# Patient Record
Sex: MALE | Race: WHITE
[De-identification: names, ages, dates, MRNs, and addresses within clinical notes are randomized per-mention and may not be internally consistent; named-entity substitution may affect disease eponyms.]

---

## 2018-08-04 ENCOUNTER — HOSPITAL ENCOUNTER (INPATIENT)
Dept: HOSPITAL 12 - ER | Age: 53
LOS: 6 days | Discharge: NURSING, REHAB OR LONG-TERM CARE | DRG: 59 | End: 2018-08-10
Payer: MEDICARE

## 2018-08-04 VITALS — SYSTOLIC BLOOD PRESSURE: 106 MMHG | DIASTOLIC BLOOD PRESSURE: 70 MMHG

## 2018-08-04 VITALS — WEIGHT: 145 LBS | BODY MASS INDEX: 22.76 KG/M2 | HEIGHT: 67 IN

## 2018-08-04 VITALS — SYSTOLIC BLOOD PRESSURE: 110 MMHG | DIASTOLIC BLOOD PRESSURE: 74 MMHG

## 2018-08-04 VITALS — DIASTOLIC BLOOD PRESSURE: 72 MMHG | SYSTOLIC BLOOD PRESSURE: 116 MMHG

## 2018-08-04 DIAGNOSIS — K52.1: ICD-10-CM

## 2018-08-04 DIAGNOSIS — K59.00: ICD-10-CM

## 2018-08-04 DIAGNOSIS — Z74.09: ICD-10-CM

## 2018-08-04 DIAGNOSIS — E05.90: ICD-10-CM
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
## 2018-08-04 DIAGNOSIS — Q63.8: ICD-10-CM

## 2018-08-04 DIAGNOSIS — G35: Primary | ICD-10-CM

## 2018-08-04 DIAGNOSIS — R53.1: ICD-10-CM

## 2018-08-04 DIAGNOSIS — T47.4X5A: ICD-10-CM

## 2018-08-04 DIAGNOSIS — N13.2: ICD-10-CM

## 2018-08-04 DIAGNOSIS — Z87.891: ICD-10-CM

## 2018-08-04 DIAGNOSIS — D72.829: ICD-10-CM

## 2018-08-04 DIAGNOSIS — T38.0X5A: ICD-10-CM

## 2018-08-04 DIAGNOSIS — Z98.890: ICD-10-CM

## 2018-08-04 DIAGNOSIS — N39.0: ICD-10-CM

## 2018-08-04 DIAGNOSIS — D68.59: ICD-10-CM

## 2018-08-04 DIAGNOSIS — M62.838: ICD-10-CM
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
## 2018-08-04 LAB
ALP SERPL-CCNC: 90 U/L (ref 50–136)
ALT SERPL W/O P-5'-P-CCNC: 22 U/L (ref 16–63)
APPEARANCE UR: CLEAR
AST SERPL-CCNC: 23 U/L (ref 15–37)
BASOPHILS # BLD AUTO: 0.1 K/UL (ref 0–8)
BASOPHILS NFR BLD AUTO: 0.9 % (ref 0–2)
BILIRUB SERPL-MCNC: 1.1 MG/DL (ref 0.2–1)
BILIRUB UR QL STRIP: NEGATIVE
BUN SERPL-MCNC: 8 MG/DL (ref 7–18)
CHLORIDE SERPL-SCNC: 105 MMOL/L (ref 98–107)
CO2 SERPL-SCNC: 34 MMOL/L (ref 21–32)
COLOR UR: YELLOW
CREAT SERPL-MCNC: 0.8 MG/DL (ref 0.6–1.3)
DEPRECATED SQUAMOUS URNS QL MICRO: (no result) /HPF
EOSINOPHIL # BLD AUTO: 0.1 K/UL (ref 0–0.7)
EOSINOPHIL NFR BLD AUTO: 1.4 % (ref 0–7)
GLUCOSE SERPL-MCNC: 89 MG/DL (ref 74–106)
GLUCOSE UR STRIP-MCNC: NEGATIVE MG/DL
HCT VFR BLD AUTO: 42 % (ref 36.7–47.1)
HGB BLD-MCNC: 14.4 G/DL (ref 12.5–16.3)
KETONES UR STRIP-MCNC: NEGATIVE MG/DL
LEUKOCYTE ESTERASE UR QL STRIP: NEGATIVE
LYMPHOCYTES # BLD AUTO: 1.1 K/UL (ref 20–40)
LYMPHOCYTES NFR BLD AUTO: 12.7 % (ref 20.5–51.5)
MCH RBC QN AUTO: 30.8 UUG (ref 23.8–33.4)
MCHC RBC AUTO-ENTMCNC: 34 G/DL (ref 32.5–36.3)
MCV RBC AUTO: 89.7 FL (ref 73–96.2)
MONOCYTES # BLD AUTO: 0.7 K/UL (ref 2–10)
MONOCYTES NFR BLD AUTO: 7.8 % (ref 0–11)
MUCOUS THREADS URNS QL MICRO: (no result) /LPF
NEUTROPHILS # BLD AUTO: 6.6 K/UL (ref 1.8–8.9)
NEUTROPHILS NFR BLD AUTO: 77.2 % (ref 38.5–71.5)
NITRITE UR QL STRIP: NEGATIVE
PH UR STRIP: 8.5 [PH] (ref 5–8)
PLATELET # BLD AUTO: 180 K/UL (ref 152–348)
POTASSIUM SERPL-SCNC: 4.2 MMOL/L (ref 3.5–5.1)
PROT UR QL STRIP: NEGATIVE
RBC # BLD AUTO: 4.68 MIL/UL (ref 4.06–5.63)
RBC #/AREA URNS HPF: (no result) /HPF (ref 0–3)
SP GR UR STRIP: 1.01 (ref 1–1.03)
UROBILINOGEN UR STRIP-MCNC: 0.2 E.U./DL
WBC # BLD AUTO: 8.6 K/UL (ref 3.6–10.2)
WBC #/AREA URNS HPF: (no result) /HPF
WBC #/AREA URNS HPF: (no result) /HPF (ref 0–3)
WS STN SPEC: 7.1 G/DL (ref 6.4–8.2)

## 2018-08-04 PROCEDURE — A4663 DIALYSIS BLOOD PRESSURE CUFF: HCPCS

## 2018-08-04 PROCEDURE — C1769 GUIDE WIRE: HCPCS

## 2018-08-04 PROCEDURE — C1876 STENT, NON-COA/NON-COV W/DEL: HCPCS

## 2018-08-04 PROCEDURE — C1758 CATHETER, URETERAL: HCPCS

## 2018-08-04 RX ADMIN — SODIUM CHLORIDE PRN MLS/HR: 0.9 INJECTION, SOLUTION INTRAVENOUS at 14:10

## 2018-08-04 RX ADMIN — ENOXAPARIN SODIUM SCH MG: 40 INJECTION SUBCUTANEOUS at 14:04

## 2018-08-04 NOTE — NUR
s2 year old male admitted to room 220 for weakens .pt is axox4.orient the pt to room and 
surroundings.md called for admission orders

## 2018-08-04 NOTE — NUR
PT BIB  FROM HOME WHERE THE PT LIVES WITH MOTHER, PT HAS HAD CONSTIPATION 
FOR A COUPLE OF DAY, TRIED TO TREAT IT WITH OTC MEDS WHICH CAUSED DIAHREA SINCE 
NOW, PT ALSO CO N/V/ABDOMINAL PAIN SINCE LAST NIGHT AND URINARY RETENTION 
TODAY. PT C/O INCREASED EDEMA ON BOTH ANKLES. PT BEDBOUND DUE TO MS

## 2018-08-04 NOTE — NUR
CORRECTION TO PREVIOUS NOTE:

PT JUST SAID THAT THE PT WAS ABLE TO MOVE FROM BED TO SCOOTER AND WALK ALTHOUGH 
WITH DIFFICULTY BEFORE. AND THAT THE PT HAS LEFT SIDE WEAKNESS DUE TO MS. BUT 
TODAY MORNING HE WAS NOT ABLE TO MOVE AS HIS USUAL BASE LINE.

## 2018-08-04 NOTE — NUR
RECEIVED PATIENT IN BED ALERT ORIENTED NO SOB NO CHEST PAIN NOTED, NO COMPLAIN OF PAIN AT 
THIS TIME. KEPT CLEAN DRY AND COMFORTABLE. PATIENT AWARE OF PROCEDURE MRI OF THE BRAIN 
WITH/WO CONTRAST AND STATED HE WILL SIGN THE CONSENT. CALL LIGHT WITHIN REACH.

## 2018-08-05 VITALS — SYSTOLIC BLOOD PRESSURE: 101 MMHG | DIASTOLIC BLOOD PRESSURE: 68 MMHG

## 2018-08-05 VITALS — SYSTOLIC BLOOD PRESSURE: 110 MMHG | DIASTOLIC BLOOD PRESSURE: 68 MMHG

## 2018-08-05 VITALS — SYSTOLIC BLOOD PRESSURE: 113 MMHG | DIASTOLIC BLOOD PRESSURE: 77 MMHG

## 2018-08-05 LAB
BASOPHILS # BLD AUTO: 0 K/UL (ref 0–8)
BASOPHILS NFR BLD AUTO: 0.1 % (ref 0–2)
BUN SERPL-MCNC: 11 MG/DL (ref 7–18)
CHLORIDE SERPL-SCNC: 108 MMOL/L (ref 98–107)
CO2 SERPL-SCNC: 25 MMOL/L (ref 21–32)
CREAT SERPL-MCNC: 0.5 MG/DL (ref 0.6–1.3)
EOSINOPHIL # BLD AUTO: 0 K/UL (ref 0–0.7)
EOSINOPHIL NFR BLD AUTO: 0 % (ref 0–7)
GLUCOSE SERPL-MCNC: 130 MG/DL (ref 74–106)
HCT VFR BLD AUTO: 38 % (ref 36.7–47.1)
HGB BLD-MCNC: 12.9 G/DL (ref 12.5–16.3)
LYMPHOCYTES # BLD AUTO: 0.3 K/UL (ref 20–40)
LYMPHOCYTES NFR BLD AUTO: 5.2 % (ref 20.5–51.5)
MAGNESIUM SERPL-MCNC: 2.2 MG/DL (ref 1.8–2.4)
MCH RBC QN AUTO: 30.2 UUG (ref 23.8–33.4)
MCHC RBC AUTO-ENTMCNC: 34 G/DL (ref 32.5–36.3)
MCV RBC AUTO: 88.7 FL (ref 73–96.2)
MONOCYTES # BLD AUTO: 0.1 K/UL (ref 2–10)
MONOCYTES NFR BLD AUTO: 1.6 % (ref 0–11)
NEUTROPHILS # BLD AUTO: 6.2 K/UL (ref 1.8–8.9)
NEUTROPHILS NFR BLD AUTO: 93.1 % (ref 38.5–71.5)
PHOSPHATE SERPL-MCNC: 3.2 MG/DL (ref 2.5–4.9)
PLATELET # BLD AUTO: 167 K/UL (ref 152–348)
POTASSIUM SERPL-SCNC: 4.1 MMOL/L (ref 3.5–5.1)
RBC # BLD AUTO: 4.28 MIL/UL (ref 4.06–5.63)
WBC # BLD AUTO: 6.6 K/UL (ref 3.6–10.2)

## 2018-08-05 RX ADMIN — Medication SCH MG: at 10:15

## 2018-08-05 RX ADMIN — SODIUM CHLORIDE PRN MLS/HR: 0.9 INJECTION, SOLUTION INTRAVENOUS at 04:03

## 2018-08-05 RX ADMIN — ENOXAPARIN SODIUM SCH MG: 40 INJECTION SUBCUTANEOUS at 13:14

## 2018-08-05 RX ADMIN — Medication SCH MG: at 20:18

## 2018-08-05 RX ADMIN — HYDROCODONE BITARTRATE AND ACETAMINOPHEN PRN TAB: 5; 325 TABLET ORAL at 20:18

## 2018-08-05 RX ADMIN — HYDROCODONE BITARTRATE AND ACETAMINOPHEN PRN TAB: 5; 325 TABLET ORAL at 01:51

## 2018-08-05 RX ADMIN — Medication SCH MG: at 13:12

## 2018-08-05 RX ADMIN — SODIUM CHLORIDE PRN MLS/HR: 0.9 INJECTION, SOLUTION INTRAVENOUS at 23:41

## 2018-08-05 NOTE — NUR
RECEIVED PT AWAKE ON BED, AAOX4, DENIES ANY SOB OR CHEST PAIN. IV SITE ON RFA, PATENT AND 
INTACT. ON FC, DRAINING WELL VIA GRAVITY. PT COMPLAIN OF PRESSURE AND SLIGHT PAIN ON LOWER 
ABDOMINAL AREA, REQUESTING TO HAVE FC CHANGED. PHONE CALL MADE TO DR COURTNEY GARCIAS, 
RECEIVED ORDER TO CHANGE CURRENT FC. ORDER RECEIVED AND CARRIED OUT.

## 2018-08-05 NOTE — NUR
Patient slept on and off, no complain of pain, patient refused to be turned side by side, 
complain of buttock/tail bone pain, medicated for pain, with help after one hour, patient 
signed consent for mri with contrast and without contrast, heels elevated with pillow, call 
light within reach.

## 2018-08-05 NOTE — NUR
PATIENT C/O OF LOWER ABDOMINAL PRESSURE AND SLIGHT PAIN AND FEELS THAT HIS URINE IS NOT 
DRAINING. USED BLADDER SCANNER, RESULT WAS ZERO ML OF URINE IN THE BLADDER. ROPER CATH 
INTACT, PATENT, DRAINING YELLOW SLIGHTLY DARK URINE. WILL CONTINUE TO MONITOR AND ENDORSE TO 
NIGHT SHIFT RN.

## 2018-08-05 NOTE — NUR
Patient slept for 8 hrs, continent of bladder, uses bathroom for bladder elimination, no 
complain of pain, with episodes of anxiety, wanting to leave the room and risk for 
elopement, cont on 1;1 sitter for safety. cont to monitor. 

-------------------------------------------------------------------------------

Addendum: 08/05/18 at 0641 by TANYA ALAN RN

-------------------------------------------------------------------------------

Patient slept for 8 hrs, continet of bladder, uses bathroom for bladder elimination, no 
complain of pain, with episodes of anxiety, wanting to leave the room and risk for 
elopement, cont on 1;1 sitter for safety, cont to monitor, charting in error.

## 2018-08-05 NOTE — NUR
FC CHANGED, PT VERBALIZED RELIEF OF PRESSURE ON LOWER ABDOMEN AFTER CHANGING FC. FC DRAINING 
WELL VIA GRAVITY, CLEAR YELLOW URINE, NO COMPLAINTS OF PAIN, WILL CONTINUE TO MONITOR.

## 2018-08-06 VITALS — DIASTOLIC BLOOD PRESSURE: 77 MMHG | SYSTOLIC BLOOD PRESSURE: 129 MMHG

## 2018-08-06 VITALS — DIASTOLIC BLOOD PRESSURE: 71 MMHG | SYSTOLIC BLOOD PRESSURE: 113 MMHG

## 2018-08-06 VITALS — SYSTOLIC BLOOD PRESSURE: 113 MMHG | DIASTOLIC BLOOD PRESSURE: 65 MMHG

## 2018-08-06 VITALS — SYSTOLIC BLOOD PRESSURE: 110 MMHG | DIASTOLIC BLOOD PRESSURE: 80 MMHG

## 2018-08-06 RX ADMIN — ANORECTAL OINTMENT PRN APPLIC: 15.7; .44; 24; 20.6 OINTMENT TOPICAL at 20:14

## 2018-08-06 RX ADMIN — HYDROCODONE BITARTRATE AND ACETAMINOPHEN PRN TAB: 5; 325 TABLET ORAL at 15:56

## 2018-08-06 RX ADMIN — Medication SCH MG: at 20:13

## 2018-08-06 RX ADMIN — HYDROCODONE BITARTRATE AND ACETAMINOPHEN PRN TAB: 5; 325 TABLET ORAL at 20:13

## 2018-08-06 RX ADMIN — BACLOFEN SCH MG: 10 TABLET ORAL at 10:29

## 2018-08-06 RX ADMIN — SODIUM CHLORIDE PRN MLS/HR: 0.9 INJECTION, SOLUTION INTRAVENOUS at 13:09

## 2018-08-06 RX ADMIN — ENOXAPARIN SODIUM SCH MG: 40 INJECTION SUBCUTANEOUS at 08:14

## 2018-08-06 RX ADMIN — BACLOFEN SCH MG: 10 TABLET ORAL at 16:39

## 2018-08-06 NOTE — NUR
PT RESTING COMFORTABLY ON BED, AAOX4, NO SIGNS OF RESPIRATORY DISTRESS NOTED. ON FC, 
DRAINING WELL VIA GRAVITY. IV SITE ON RFA, PATENT AND INTACT. SAFE ENVIRONMENT MAINTAINED AT 
ALL TIMES, CALL BELL WITHIN REACH.

## 2018-08-06 NOTE — NUR
Patient awake & alert no SOB denies chest pain c/o constipation x 2 days. Vital signs WNL. 
Routine night meds given, Milk of magnesia po provided. For surgery procedure tomorrow, 
instructed to be NPO post breakfast. Patient verbalized understanding.

## 2018-08-06 NOTE — NUR
PT COMPLAINTS OF STIFFNESS AND REQUESTING TO HAVE BACLOFEN 10 MG (HOME MEDICATION). PT IS 
AWARE THAT MEDICATION IS HELD AT ADMISSION. PAGED ON CALL MD, RECEIVED ORDER FROM JIMMY POLANCO 
TO GIVE ONE TIME DOSE OF BACLOFEN 10MG TONIGHT. ORDER CARRIED OUT.

## 2018-08-07 VITALS — DIASTOLIC BLOOD PRESSURE: 73 MMHG | SYSTOLIC BLOOD PRESSURE: 117 MMHG

## 2018-08-07 VITALS — DIASTOLIC BLOOD PRESSURE: 85 MMHG | SYSTOLIC BLOOD PRESSURE: 123 MMHG

## 2018-08-07 VITALS — SYSTOLIC BLOOD PRESSURE: 131 MMHG | DIASTOLIC BLOOD PRESSURE: 83 MMHG

## 2018-08-07 VITALS — DIASTOLIC BLOOD PRESSURE: 72 MMHG | SYSTOLIC BLOOD PRESSURE: 107 MMHG

## 2018-08-07 PROCEDURE — BT1DYZZ FLUOROSCOPY OF RIGHT KIDNEY, URETER AND BLADDER USING OTHER CONTRAST: ICD-10-PCS

## 2018-08-07 PROCEDURE — 0T768DZ DILATION OF RIGHT URETER WITH INTRALUMINAL DEVICE, VIA NATURAL OR ARTIFICIAL OPENING ENDOSCOPIC: ICD-10-PCS

## 2018-08-07 RX ADMIN — ENOXAPARIN SODIUM SCH MG: 40 INJECTION SUBCUTANEOUS at 09:00

## 2018-08-07 RX ADMIN — SODIUM CHLORIDE PRN MLS/HR: 0.9 INJECTION, SOLUTION INTRAVENOUS at 15:53

## 2018-08-07 RX ADMIN — HYDROCODONE BITARTRATE AND ACETAMINOPHEN PRN TAB: 5; 325 TABLET ORAL at 06:43

## 2018-08-07 RX ADMIN — BACLOFEN SCH MG: 10 TABLET ORAL at 08:07

## 2018-08-07 RX ADMIN — Medication SCH MG: at 20:49

## 2018-08-07 RX ADMIN — HYDROCODONE BITARTRATE AND ACETAMINOPHEN PRN TAB: 5; 325 TABLET ORAL at 20:49

## 2018-08-07 RX ADMIN — Medication PRN MG: at 21:02

## 2018-08-07 RX ADMIN — BACLOFEN SCH MG: 10 TABLET ORAL at 17:00

## 2018-08-07 RX ADMIN — SODIUM CHLORIDE PRN MLS/HR: 0.9 INJECTION, SOLUTION INTRAVENOUS at 00:47

## 2018-08-07 NOTE — NUR
Received from Recovery room, patient awake alert & oriented no SOB denies chest pain. 
Complaining of flores catheter site. Pink tinged flores output noted. Vital signs are WNL. 
Offered pain meds but patient refused.  Instructed to increase fluid intake, patient 
verbalized understanding.

## 2018-08-07 NOTE — NUR
Medicated for pain PRN. No acute resp distress. NPO post breakfast, for Right Ureteroscopy 
possible Lithotripsy procedure today.

## 2018-08-08 VITALS — SYSTOLIC BLOOD PRESSURE: 117 MMHG | DIASTOLIC BLOOD PRESSURE: 79 MMHG

## 2018-08-08 VITALS — SYSTOLIC BLOOD PRESSURE: 128 MMHG | DIASTOLIC BLOOD PRESSURE: 74 MMHG

## 2018-08-08 VITALS — SYSTOLIC BLOOD PRESSURE: 132 MMHG | DIASTOLIC BLOOD PRESSURE: 72 MMHG

## 2018-08-08 VITALS — SYSTOLIC BLOOD PRESSURE: 121 MMHG | DIASTOLIC BLOOD PRESSURE: 75 MMHG

## 2018-08-08 LAB
ALP SERPL-CCNC: 64 U/L (ref 50–136)
ALT SERPL W/O P-5'-P-CCNC: 61 U/L (ref 16–63)
AST SERPL-CCNC: 36 U/L (ref 15–37)
BASOPHILS # BLD AUTO: 0 K/UL (ref 0–8)
BASOPHILS NFR BLD AUTO: 0.3 % (ref 0–2)
BILIRUB SERPL-MCNC: 0.6 MG/DL (ref 0.2–1)
BUN SERPL-MCNC: 19 MG/DL (ref 7–18)
CHLORIDE SERPL-SCNC: 108 MMOL/L (ref 98–107)
CHOLEST SERPL-MCNC: 121 MG/DL (ref ?–200)
CO2 SERPL-SCNC: 27 MMOL/L (ref 21–32)
CREAT SERPL-MCNC: 0.7 MG/DL (ref 0.6–1.3)
EOSINOPHIL # BLD AUTO: 0 K/UL (ref 0–0.7)
EOSINOPHIL NFR BLD AUTO: 0 % (ref 0–7)
GLUCOSE SERPL-MCNC: 103 MG/DL (ref 74–106)
HCT VFR BLD AUTO: 36.8 % (ref 36.7–47.1)
HDLC SERPL-MCNC: 72 MG/DL (ref 40–60)
HGB BLD-MCNC: 12.5 G/DL (ref 12.5–16.3)
LYMPHOCYTES # BLD AUTO: 0.2 K/UL (ref 20–40)
LYMPHOCYTES NFR BLD AUTO: 1.1 % (ref 20.5–51.5)
MAGNESIUM SERPL-MCNC: 2.3 MG/DL (ref 1.8–2.4)
MCH RBC QN AUTO: 30.9 UUG (ref 23.8–33.4)
MCHC RBC AUTO-ENTMCNC: 34 G/DL (ref 32.5–36.3)
MCV RBC AUTO: 90.9 FL (ref 73–96.2)
MONOCYTES # BLD AUTO: 0.5 K/UL (ref 2–10)
MONOCYTES NFR BLD AUTO: 3 % (ref 0–11)
NEUTROPHILS # BLD AUTO: 16.1 K/UL (ref 1.8–8.9)
NEUTROPHILS NFR BLD AUTO: 95.6 % (ref 38.5–71.5)
PHOSPHATE SERPL-MCNC: 3.9 MG/DL (ref 2.5–4.9)
PLATELET # BLD AUTO: 160 K/UL (ref 152–348)
POTASSIUM SERPL-SCNC: 3.9 MMOL/L (ref 3.5–5.1)
RBC # BLD AUTO: 4.05 MIL/UL (ref 4.06–5.63)
TRIGL SERPL-MCNC: 48 MG/DL (ref 30–150)
TSH SERPL DL<=0.005 MIU/L-ACNC: 0.17 MIU/ML (ref 0.36–3.74)
URATE SERPL-MCNC: 2 MG/DL (ref 3.5–7.2)
WBC # BLD AUTO: 16.8 K/UL (ref 3.6–10.2)
WS STN SPEC: 5.1 G/DL (ref 6.4–8.2)

## 2018-08-08 RX ADMIN — Medication SCH MG: at 20:12

## 2018-08-08 RX ADMIN — Medication PRN MG: at 20:12

## 2018-08-08 RX ADMIN — SODIUM CHLORIDE PRN MLS/HR: 0.9 INJECTION, SOLUTION INTRAVENOUS at 06:49

## 2018-08-08 RX ADMIN — ANORECTAL OINTMENT PRN APPLIC: 15.7; .44; 24; 20.6 OINTMENT TOPICAL at 20:24

## 2018-08-08 RX ADMIN — ENOXAPARIN SODIUM SCH MG: 40 INJECTION SUBCUTANEOUS at 08:29

## 2018-08-08 RX ADMIN — BACLOFEN SCH MG: 10 TABLET ORAL at 17:04

## 2018-08-08 RX ADMIN — BACLOFEN SCH MG: 10 TABLET ORAL at 08:29

## 2018-08-08 RX ADMIN — SODIUM CHLORIDE PRN MLS/HR: 0.9 INJECTION, SOLUTION INTRAVENOUS at 20:01

## 2018-08-08 NOTE — NUR
PT OBSERVED RESTING IN BED, DENIES PAIN, REPOSITIONING OF THE BODY PROVIDED, CALM, 
COOPERATIVE, COMPLIANT WITH MEDICATIONS, DIET AND CARE. FAMILY AT BEDSIDE THROUGHOUT THE 
DAY. ROPER CONTINUES TO DRAIN AND IS PATENT, URINE PINKISH/YELLOW IN COLOR WITH RED 
SEDIMENT.  CONTINUE TO MONITOR PT.

## 2018-08-08 NOTE — NUR
Awake at this time, c/o gen itchiness, patient asking for Benadryl. Paged EPIC MD on call 
for orders.

## 2018-08-09 VITALS — DIASTOLIC BLOOD PRESSURE: 72 MMHG | SYSTOLIC BLOOD PRESSURE: 128 MMHG

## 2018-08-09 VITALS — DIASTOLIC BLOOD PRESSURE: 78 MMHG | SYSTOLIC BLOOD PRESSURE: 120 MMHG

## 2018-08-09 VITALS — SYSTOLIC BLOOD PRESSURE: 124 MMHG | DIASTOLIC BLOOD PRESSURE: 71 MMHG

## 2018-08-09 VITALS — SYSTOLIC BLOOD PRESSURE: 108 MMHG | DIASTOLIC BLOOD PRESSURE: 62 MMHG

## 2018-08-09 LAB
APPEARANCE UR: (no result)
BASOPHILS # BLD AUTO: 0 K/UL (ref 0–8)
BASOPHILS NFR BLD AUTO: 0.1 % (ref 0–2)
BILIRUB UR QL STRIP: (no result)
BUN SERPL-MCNC: 18 MG/DL (ref 7–18)
CHLORIDE SERPL-SCNC: 108 MMOL/L (ref 98–107)
CO2 SERPL-SCNC: 29 MMOL/L (ref 21–32)
COLOR UR: (no result)
CREAT SERPL-MCNC: 0.6 MG/DL (ref 0.6–1.3)
DEPRECATED SQUAMOUS URNS QL MICRO: (no result) /HPF
EOSINOPHIL # BLD AUTO: 0 K/UL (ref 0–0.7)
EOSINOPHIL NFR BLD AUTO: 0 % (ref 0–7)
GLUCOSE SERPL-MCNC: 104 MG/DL (ref 74–106)
GLUCOSE UR STRIP-MCNC: NEGATIVE MG/DL
HCT VFR BLD AUTO: 37 % (ref 36.7–47.1)
HGB BLD-MCNC: 12.6 G/DL (ref 12.5–16.3)
HGB UR QL STRIP: (no result)
KETONES UR STRIP-MCNC: NEGATIVE MG/DL
LEUKOCYTE ESTERASE UR QL STRIP: (no result)
LYMPHOCYTES # BLD AUTO: 0.3 K/UL (ref 20–40)
LYMPHOCYTES NFR BLD AUTO: 2.3 % (ref 20.5–51.5)
MAGNESIUM SERPL-MCNC: 2.2 MG/DL (ref 1.8–2.4)
MCH RBC QN AUTO: 30.8 UUG (ref 23.8–33.4)
MCHC RBC AUTO-ENTMCNC: 34 G/DL (ref 32.5–36.3)
MCV RBC AUTO: 90.5 FL (ref 73–96.2)
MONOCYTES # BLD AUTO: 0.9 K/UL (ref 2–10)
MONOCYTES NFR BLD AUTO: 6.1 % (ref 0–11)
NEUTROPHILS # BLD AUTO: 12.8 K/UL (ref 1.8–8.9)
NEUTROPHILS NFR BLD AUTO: 91.5 % (ref 38.5–71.5)
NITRITE UR QL STRIP: POSITIVE
PH UR STRIP: 6.5 [PH] (ref 5–8)
PHOSPHATE SERPL-MCNC: 3.2 MG/DL (ref 2.5–4.9)
PLATELET # BLD AUTO: 147 K/UL (ref 152–348)
POTASSIUM SERPL-SCNC: 3.9 MMOL/L (ref 3.5–5.1)
PROT UR QL STRIP: (no result)
RBC # BLD AUTO: 4.09 MIL/UL (ref 4.06–5.63)
RBC #/AREA URNS HPF: (no result) /HPF (ref 0–3)
SP GR UR STRIP: 1.02 (ref 1–1.03)
TSH SERPL DL<=0.005 MIU/L-ACNC: 0.18 MIU/ML (ref 0.36–3.74)
UROBILINOGEN UR STRIP-MCNC: 1 E.U./DL
WBC # BLD AUTO: 14 K/UL (ref 3.6–10.2)
WBC #/AREA URNS HPF: (no result) /HPF

## 2018-08-09 RX ADMIN — SODIUM CHLORIDE PRN MLS/HR: 0.9 INJECTION, SOLUTION INTRAVENOUS at 09:24

## 2018-08-09 RX ADMIN — HYDROCODONE BITARTRATE AND ACETAMINOPHEN PRN TAB: 5; 325 TABLET ORAL at 20:18

## 2018-08-09 RX ADMIN — Medication SCH MG: at 20:17

## 2018-08-09 RX ADMIN — BACLOFEN SCH MG: 10 TABLET ORAL at 17:05

## 2018-08-09 RX ADMIN — SODIUM CHLORIDE PRN MLS/HR: 0.9 INJECTION, SOLUTION INTRAVENOUS at 23:56

## 2018-08-09 RX ADMIN — DEXTROSE SCH MLS/HR: 50 INJECTION, SOLUTION INTRAVENOUS at 12:47

## 2018-08-09 RX ADMIN — BACLOFEN SCH MG: 10 TABLET ORAL at 08:14

## 2018-08-09 RX ADMIN — HYDROCODONE BITARTRATE AND ACETAMINOPHEN PRN TAB: 5; 325 TABLET ORAL at 10:15

## 2018-08-09 NOTE — NUR
PT OBSERVED RESTING BED CALM, COOPERATIVE, NO SIGNS OF RESPIRATORY DISTRESS. ROPER CATHETER 
PATENT AND DRAIN PINK URINE. CONTINUE TO MONITOR PT.

## 2018-08-09 NOTE — NUR
PT C/O LOWER ABDOMINAL PAIN 10/10, " "PRESSURE AND BURNING". BLADDER SCAN DONE, 121ML IN 
BLADDER.UROLOGIST NOTIFIED. PROVIDED PAIN MEDS. PT WAS REPOSITIONED ON TO RIGHT SIDE. URINE 
BEGAN TO FLOW INTO ROPER. PT VERBALIZES RELIEF 5/10. CONTINUE TO MONITOR PT.

## 2018-08-09 NOTE — NUR
Pt awake, alert and cooperative. In no apparent acute distress. IVF infusing well at right 
arm, site benign. Lee cath intact and patent, color remains pink tinged. Medications 
reviewed per pt request and pt expresses adequate understanding. States awareness of DC 
plans to [facility] in AM and is looking forward to getting better.

## 2018-08-10 ENCOUNTER — HOSPITAL ENCOUNTER (INPATIENT)
Dept: HOSPITAL 54 - ER | Age: 53
LOS: 4 days | Discharge: HOME | DRG: 690 | End: 2018-08-14
Attending: INTERNAL MEDICINE | Admitting: INTERNAL MEDICINE
Payer: MEDICARE

## 2018-08-10 VITALS — DIASTOLIC BLOOD PRESSURE: 86 MMHG | SYSTOLIC BLOOD PRESSURE: 136 MMHG

## 2018-08-10 VITALS — WEIGHT: 156 LBS | BODY MASS INDEX: 25.07 KG/M2 | HEIGHT: 66 IN

## 2018-08-10 VITALS — DIASTOLIC BLOOD PRESSURE: 77 MMHG | SYSTOLIC BLOOD PRESSURE: 123 MMHG

## 2018-08-10 VITALS — SYSTOLIC BLOOD PRESSURE: 133 MMHG | DIASTOLIC BLOOD PRESSURE: 77 MMHG

## 2018-08-10 DIAGNOSIS — N20.0: ICD-10-CM

## 2018-08-10 DIAGNOSIS — K59.00: ICD-10-CM

## 2018-08-10 DIAGNOSIS — D72.829: ICD-10-CM

## 2018-08-10 DIAGNOSIS — Z87.440: ICD-10-CM

## 2018-08-10 DIAGNOSIS — G35: ICD-10-CM

## 2018-08-10 DIAGNOSIS — N30.91: Primary | ICD-10-CM
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
## 2018-08-10 DIAGNOSIS — E87.70: ICD-10-CM

## 2018-08-10 DIAGNOSIS — G89.29: ICD-10-CM

## 2018-08-10 DIAGNOSIS — Z87.442: ICD-10-CM

## 2018-08-10 DIAGNOSIS — T38.0X5A: ICD-10-CM

## 2018-08-10 LAB
ALBUMIN SERPL BCP-MCNC: 2.4 G/DL (ref 3.4–5)
ALP SERPL-CCNC: 71 U/L (ref 46–116)
ALT SERPL W P-5'-P-CCNC: 80 U/L (ref 12–78)
APTT PPP: 25 SEC (ref 23–34)
AST SERPL W P-5'-P-CCNC: 24 U/L (ref 15–37)
BASOPHILS # BLD AUTO: 0.4 /CMM (ref 0–0.2)
BASOPHILS NFR BLD AUTO: 2 % (ref 0–2)
BILIRUB DIRECT SERPL-MCNC: 0.1 MG/DL (ref 0–0.2)
BILIRUB SERPL-MCNC: 0.5 MG/DL (ref 0.2–1)
BUN SERPL-MCNC: 15 MG/DL (ref 7–18)
CALCIUM SERPL-MCNC: 8.1 MG/DL (ref 8.5–10.1)
CHLORIDE SERPL-SCNC: 103 MMOL/L (ref 98–107)
CO2 SERPL-SCNC: 32 MMOL/L (ref 21–32)
CREAT SERPL-MCNC: 0.6 MG/DL (ref 0.6–1.3)
EOSINOPHIL NFR BLD AUTO: 0.1 % (ref 0–6)
GLUCOSE SERPL-MCNC: 116 MG/DL (ref 74–106)
HCT VFR BLD AUTO: 41 % (ref 39–51)
HGB BLD-MCNC: 14 G/DL (ref 13.5–17.5)
INR PPP: 1.11 (ref 0.85–1.15)
LYMPHOCYTES NFR BLD AUTO: 0.7 /CMM (ref 0.8–4.8)
LYMPHOCYTES NFR BLD AUTO: 3.7 % (ref 20–44)
MCH RBC QN AUTO: 31 PG (ref 26–33)
MCHC RBC AUTO-ENTMCNC: 34 G/DL (ref 31–36)
MCV RBC AUTO: 89 FL (ref 80–96)
MONOCYTES NFR BLD AUTO: 0.9 /CMM (ref 0.1–1.3)
MONOCYTES NFR BLD AUTO: 4.6 % (ref 2–12)
NEUTROPHILS # BLD AUTO: 18 /CMM (ref 1.8–8.9)
NEUTROPHILS NFR BLD AUTO: 89.6 % (ref 43–81)
PH UR STRIP: 8.5 [PH] (ref 5–8)
PLATELET # BLD AUTO: 177 /CMM (ref 150–450)
POTASSIUM SERPL-SCNC: 3.6 MMOL/L (ref 3.5–5.1)
PROT SERPL-MCNC: 5.2 G/DL (ref 6.4–8.2)
PROT UR QL STRIP: 100 MG/DL
RBC # BLD AUTO: 4.56 MIL/UL (ref 4.5–6)
RBC #/AREA URNS HPF: (no result) /HPF (ref 0–2)
RDW COEFFICIENT OF VARIATION: 12.2 (ref 11.5–15)
SODIUM SERPL-SCNC: 136 MMOL/L (ref 136–145)
TROPONIN I SERPL-MCNC: < 0.017 NG/ML (ref 0–0.06)
UROBILINOGEN UR STRIP-MCNC: 1 EU/DL
WBC #/AREA URNS HPF: (no result) /HPF (ref 0–3)
WBC NRBC COR # BLD AUTO: 20 K/UL (ref 4.3–11)

## 2018-08-10 PROCEDURE — Z7610: HCPCS

## 2018-08-10 PROCEDURE — A4606 OXYGEN PROBE USED W OXIMETER: HCPCS

## 2018-08-10 PROCEDURE — A4217 STERILE WATER/SALINE, 500 ML: HCPCS

## 2018-08-10 RX ADMIN — BACLOFEN SCH MG: 10 TABLET ORAL at 08:34

## 2018-08-10 RX ADMIN — Medication PRN EACH: at 23:45

## 2018-08-10 RX ADMIN — HYDROCODONE BITARTRATE AND ACETAMINOPHEN PRN TAB: 5; 325 TABLET ORAL at 12:53

## 2018-08-10 RX ADMIN — HYDROCODONE BITARTRATE AND ACETAMINOPHEN PRN TAB: 5; 325 TABLET ORAL at 08:43

## 2018-08-10 RX ADMIN — DEXTROSE SCH MLS/HR: 50 INJECTION, SOLUTION INTRAVENOUS at 08:34

## 2018-08-10 NOTE — NUR
EL NOTES:



DR. DALTON AT BEDSIDE. 

-------------------------------------------------------------------------------

Addendum: 08/10/18 at 2339 by YULI JOSEPH RN

-------------------------------------------------------------------------------

PT TAKES BACLOFEN 10MG. PER DR. DALTON OK TO START PT ON BACLOFEN 10MG PO BID.

## 2018-08-10 NOTE — NUR
PT DC TO [facility] IN [place]. PT LEFT VIA GURNEY IN AMBULANCE. PT D/C WITH 
EXIT-CARE PACKET, ALL BELONGINGS, VALUABLES, AND ROPER CATHETER. ROPER IS PATENT. REPORT 
GIVEN TO LUISA AT THE FACILITY.

## 2018-08-10 NOTE — NUR
MS RN NOTES:



PT COMPLAINING OF 7/10 LOWER BACK PAIN. PT  WAS ADMINISTERED NORCO 5. WILL CONTINUE TO 
MONITOR PT.

## 2018-08-10 NOTE — NUR
MS RN OPENING NOTES:



RECEIVED PT ON ROOM AND IS TOLERATING WELL. NO SOB NOTED. PT COMPLAINING OF 7/10 LOWER BACK 
PAIN. EXPLAINED TO PT ONCE ORDERS ARE PUT IN, WILL SEE IF HE HAS ANY PAIN MEDS. PT HAS IV ON 
R AC #18G AND IS PATENT AND INTACT .CURRENTLY H/L. PT HAS ROPER CATH AND IS ATTACHED TO 
DRAINAGE BAG WITH YELLOW URINE DRAINING. CALL LIGHT WITHIN PT'S REACH. BED KEPT IN LOW, 
LOCKED POSITION, AND SIDE RAILS X 2 UP. WILL CONTINUE TO MONITOR PT.

## 2018-08-10 NOTE — NUR
PT SHILPA TO ER BED 01. PT STATES WAS RECENTLY D/C'D AT [facility] AFTER 
LITHOTRIPSY LAST WEDNESDAY. SUPPOSED TO GO TO A REHAB PLACE BUT PT INSISTED ON 
GOING BACK TO HOSPITAL BECAUSE OF HIM NOT FEELING WELL AND HAVING DIFFUSED 
ABDOMINAL PAIN. PLACED ON MONITOR. AWAITING MD BUCK.

## 2018-08-11 VITALS — DIASTOLIC BLOOD PRESSURE: 74 MMHG | SYSTOLIC BLOOD PRESSURE: 111 MMHG

## 2018-08-11 VITALS — SYSTOLIC BLOOD PRESSURE: 127 MMHG | DIASTOLIC BLOOD PRESSURE: 78 MMHG

## 2018-08-11 VITALS — DIASTOLIC BLOOD PRESSURE: 77 MMHG | SYSTOLIC BLOOD PRESSURE: 114 MMHG

## 2018-08-11 LAB
BASOPHILS # BLD AUTO: 0 /CMM (ref 0–0.2)
BASOPHILS NFR BLD AUTO: 0 % (ref 0–2)
BUN SERPL-MCNC: 12 MG/DL (ref 7–18)
CALCIUM SERPL-MCNC: 7.9 MG/DL (ref 8.5–10.1)
CHLORIDE SERPL-SCNC: 101 MMOL/L (ref 98–107)
CO2 SERPL-SCNC: 35 MMOL/L (ref 21–32)
CREAT SERPL-MCNC: 0.6 MG/DL (ref 0.6–1.3)
EOSINOPHIL NFR BLD AUTO: 1.3 % (ref 0–6)
GLUCOSE SERPL-MCNC: 84 MG/DL (ref 74–106)
HCT VFR BLD AUTO: 41 % (ref 39–51)
HGB BLD-MCNC: 13.9 G/DL (ref 13.5–17.5)
LYMPHOCYTES NFR BLD AUTO: 1.1 /CMM (ref 0.8–4.8)
LYMPHOCYTES NFR BLD AUTO: 7.5 % (ref 20–44)
MAGNESIUM SERPL-MCNC: 2 MG/DL (ref 1.8–2.4)
MCH RBC QN AUTO: 31 PG (ref 26–33)
MCHC RBC AUTO-ENTMCNC: 34 G/DL (ref 31–36)
MCV RBC AUTO: 91 FL (ref 80–96)
MONOCYTES NFR BLD AUTO: 0.9 /CMM (ref 0.1–1.3)
MONOCYTES NFR BLD AUTO: 6.1 % (ref 2–12)
NEUTROPHILS # BLD AUTO: 12.3 /CMM (ref 1.8–8.9)
NEUTROPHILS NFR BLD AUTO: 85.1 % (ref 43–81)
NT-PROBNP SERPL-MCNC: 371 PG/ML (ref 0–125)
PHOSPHATE SERPL-MCNC: 3.1 MG/DL (ref 2.5–4.9)
PLATELET # BLD AUTO: 164 /CMM (ref 150–450)
POTASSIUM SERPL-SCNC: 3.2 MMOL/L (ref 3.5–5.1)
RBC # BLD AUTO: 4.54 MIL/UL (ref 4.5–6)
RDW COEFFICIENT OF VARIATION: 13.6 (ref 11.5–15)
SODIUM SERPL-SCNC: 139 MMOL/L (ref 136–145)
WBC NRBC COR # BLD AUTO: 14.5 K/UL (ref 4.3–11)

## 2018-08-11 RX ADMIN — BACLOFEN SCH MG: 10 TABLET ORAL at 09:37

## 2018-08-11 RX ADMIN — BACLOFEN SCH MG: 10 TABLET ORAL at 16:16

## 2018-08-11 RX ADMIN — POTASSIUM CHLORIDE SCH MEQ: 1500 TABLET, EXTENDED RELEASE ORAL at 11:18

## 2018-08-11 RX ADMIN — Medication PRN EACH: at 09:45

## 2018-08-11 RX ADMIN — POTASSIUM CHLORIDE SCH MEQ: 1500 TABLET, EXTENDED RELEASE ORAL at 11:30

## 2018-08-11 NOTE — NUR
MS RN OPENING NOTE

RECEIVED BEDSIDE SBAR REPORT ON THE PATIENT. PATIENT IS A/O X3, ASLEEP, EASILY AWAKEN. 
PATIENT IS IN BED. BED IS LOCKED IN LOWEST POSITION, SIDE RAILS UP X2, BED ALARM IS ON. 
DENIES PAIN/DISCOMFORT T THIS TIME.  PATIENT'S CHEST RISING EQUALLY/BILATERALLY. ALL NEEDS 
ARE MET. CALL LIGHT WITHIN REACH. EDUCATED THE PATIENT TO USE THE CALL LIGHT TO CALL FOR 
ASSISTANCE AND PATIENT VERBALIZED UNDERSTANDING. WILL CONTINUE TO ASSESS/MONITOR THROUGHOUT 
THE SHIFT.

## 2018-08-11 NOTE — NUR
RN NOTE



OFFERED PAIN MEDICATION TO PT AS HE WAS C/O PAIN EARLIER WHILE ABG WAS BEING DONE BUT PT 
VERBALIZED THAT HE DOESN'T NEED PAIN MEDICATION. EXPLAINED RISKS AND BENEFITS TO PT BUT PT 
REFUSED. WILL CONTINUE TO MONITOR.

## 2018-08-11 NOTE — NUR
RN OPENING NOTES



RECEIVED PT IN BED, ALERT AND ORIENTED X 3, WITH NO SOB, BREATHING EVEN AND UNLABORED, IN NO 
ACUTE DISTRESS. PT IS VERBALIZING THAT HE WANTS TO SEE AND SPEAK WITH HIS MD. NEW ORDERS 
RECEIVED BY AM SHIFT NURSE. ALL PATIENT'S NEEDS ATTENDED TO AT THIS TIME, KEPT PT CLEAN AND 
COMFORTABLE. CALL LIGHT WITHIN EASY REACH. WILL CONTINUE TO MONITOR PT.

## 2018-08-11 NOTE — NUR
MED SURG RN NOTES

PATIENT COMPLAINING THAT HE IS NOT FEELING WELL AND IS AFRAID HIS MS IS GETTING WORSE, DR. CASTRO PAGED NEW ORDERS GIVEN.

## 2018-08-11 NOTE — NUR
PATIENT REPORTED ACHING PAIN RATING 8-9/10 IN RIGHT HIP.  RIGHT HIP ASSESSED. NO BRUISE, 
REDNESS, SWELLING.  NORCO-ADMINISTERED AS PRESCRIBED.

## 2018-08-11 NOTE — NUR
ATTEMPTED ABG. PT REFUSED ABG. SPO2 98%, HR 89, RESP RATE 18. NO RESP DISTRESS OR SOB NOTED. 
RN ANTOINETTE ZAMORA.

## 2018-08-11 NOTE — NUR
RN NOTES



SPOKE WITH RTJET, PATIENT REFUSING ABG TO BE DONE, TRIED TO CONVINCE PT AND INFORMED HIM 
RISKS AND BENEFITS. AS PER PT, HE IS REFUSING BECAUSE OT THE PAIN. RESPECTED PT'S DECISION. 
RELAYED TO MD WITH NO NEW ORDER AND TO CONTINUE TO MONITOR PT.

## 2018-08-11 NOTE — NUR
RN NOTES



SPOKE WITH RT RE: PEAK FLOW RATE RESULT. RELAYED RESULT TO DR. CASTRO WITH NEW ORDER FOR STAT 
ABG. ALL ORDERS NOTED AND CARRIED OUT.

## 2018-08-11 NOTE — NUR
MS RN CLOSING NOTES:



ALL NEEDS WERE ATTENDED AND ANTICIPATED FOR. PT ASLEEP AT THIS TIME AND RESTING IN BED 
COMFORTABLY. PT ON ROOM AIR AND TOLERATING WELL. PT ON SEMI-KO'S POSITION. PT HAS ROPER 
CATH AND IS ATTACHED TO DRAINAGE BAG WITH YELLOW URINE DRAINING. OUTPUT WAS 4100ML. CALL 
LIGHT WITHIN PT'S REACH. BED KEPT IN LOW, LOCKED POSITION, AND SIDE RAILS X 2UP. WILL 
ENDORSE TO AM NURSE FOR KAVYA.

## 2018-08-11 NOTE — NUR
PEAK FLOW MEASUREMENT ATTEMPTED X3. BEST RESULT IS 50LPM. SPO2 97%, HR 88, RESP RATE 18. NO 
RESP DISTRESS OR SOB NOTED. RN ANTOINETTE ZAMORA.

## 2018-08-11 NOTE — NUR
MED SURG RN END NOTES 

PATIENT RESTING IN BED, ALL NEEDS MET, PATIENT HAS ABDOMINAL PAIN DUE TO CONSTIPATION AND 
GAS, MILK OF MAGNESIUM GIVEN, PATIENT WILL TRY COMMODE AGAIN DURING NIGHT SHIFT WILL ENDORSE 
TO NIGHT SHIFT FOR CONTINUITY OF CARE.

## 2018-08-12 VITALS — SYSTOLIC BLOOD PRESSURE: 125 MMHG | DIASTOLIC BLOOD PRESSURE: 82 MMHG

## 2018-08-12 VITALS — SYSTOLIC BLOOD PRESSURE: 133 MMHG | DIASTOLIC BLOOD PRESSURE: 84 MMHG

## 2018-08-12 VITALS — DIASTOLIC BLOOD PRESSURE: 79 MMHG | SYSTOLIC BLOOD PRESSURE: 120 MMHG

## 2018-08-12 LAB
BUN SERPL-MCNC: 15 MG/DL (ref 7–18)
CALCIUM SERPL-MCNC: 8.4 MG/DL (ref 8.5–10.1)
CHLORIDE SERPL-SCNC: 102 MMOL/L (ref 98–107)
CO2 SERPL-SCNC: 31 MMOL/L (ref 21–32)
CREAT SERPL-MCNC: 0.6 MG/DL (ref 0.6–1.3)
GLUCOSE SERPL-MCNC: 140 MG/DL (ref 74–106)
POTASSIUM SERPL-SCNC: 4 MMOL/L (ref 3.5–5.1)
SODIUM SERPL-SCNC: 138 MMOL/L (ref 136–145)

## 2018-08-12 RX ADMIN — BACLOFEN SCH MG: 10 TABLET ORAL at 16:50

## 2018-08-12 RX ADMIN — BACLOFEN SCH MG: 10 TABLET ORAL at 08:21

## 2018-08-12 RX ADMIN — ENOXAPARIN SODIUM SCH MG: 40 INJECTION SUBCUTANEOUS at 16:51

## 2018-08-12 NOTE — NUR
RN CLOSING NOTES:



  PATIENT RESTING IN BED.NONLABORED BREATHING NOTED ON ROOM AIR. DENYING PAIN. IV ON RIGHT 
AC PATENT AND INTACT.ROPER CATHETER FLUSHED AND LIGHT, PINK URINE DRAINING. 

BED IN LOWEST LOCKED POSITION .PER DR CASTRO, MONITOR FOR ADVERSE EFFECTS OF LOVENOX  CALL 
LIGHT WITHIN REACH WILL ENDORSE TO NEXT RN

## 2018-08-12 NOTE — NUR
LOVENOX 40 MG SUBQ DAILY PER DR MATTHEW CASTRO INFORMED OF LAB VALUES FOR TODAY

VERBAL READBACK DONE

## 2018-08-12 NOTE — NUR
MS RN NOTES



REPORT GIVEN TO AMPARO GALLEGOS FOR KAVYA. NO CHANGES IN LOC NOTED AT THIS TIME. PATIENT AFEBRILE, 
SKIN DRY AND WARM TO TOUCH. ROPER CATHETER IN PLACE AND DRAINING WITH PINKISH URINE OUTPUT 
WITH SMALL BLOOD CLOTS  NOTED. IV INTACT AND PATETNT. CALL LIGHT WITHIN EASY REACH

## 2018-08-12 NOTE — NUR
MS RN NOTES



PATIENT RECEIVED RESTING INSIDE ROOM. AWAKE, ALERT AND ORIENTED. VERBALLY RESPONSIVE AND 
RESPONDS TO VERBAL AND TACTILE STIMULI. NO ACUTE DISTRESS NOTED AT THIS TIME. ROPER CATHETER 
IN PLACE WITH PINK-TINGED URINE OUTPUT WITH SMALL CLOTS OF BLOOD NOTED ON BAG. PATIENT CALM 
AND RELAXED. IV INTACT AND PATENT. WILL CONTINUE TO MONITOR. BED LOCKED AND IN LOW POSITION. 
BILATERAL UPPER SIDE RAILS UP AND LOCKED. CALL LIGHT WITHIN EASY REACH

## 2018-08-12 NOTE — NUR
MS/RN OPENING NOTES

RECEIVED PATIENT IN BED, AWAKE, ALERT X3, ABLE TO VERBALIZE NEEDS, SKIN WARM TO TOUCH, ABLE 
TO VERBALIZE NEEDS, DENIES ANY DISCOMFORT, OBSERVED BLOOD IN URINE. RECEIVED REPORT FROM AM 
RN FOR KAVYA, MONITORING FOR ANY CONTINUE BLEEDING, CALL LIGHTS WITHIN REACH BED IN LOCK 
POSITION, WILL MONITOR.

## 2018-08-12 NOTE — NUR
RN CLOSING NOTES



PATIENT IN BED, ALERT AND ORIENTED X 3, NO SOB, BREATHING EVEN AND UNLABORED IN ROOM AIR AND 
IN NO ACUTE DISTRESS. ALL PATIENT'S NEEDS ATTENDED TO THROUGHOUT THE SHIFT, TURNED AND 
REPOSITIONED Q2 HOURS AND AS NEEDED. ROPER CATHETER DRAINING WELL WITH WITH PINK TINGED 
URINE.  PLACED CALL LIGHT WITHIN EASY REACH, BED IN LOW POSITION AND LOCKED IN PLACE. WILL 
ENDORSE TO AM SHIFT NURSE FOR CONTINUITY OF CARE.

## 2018-08-12 NOTE — NUR
PATIENT REFUSING APPLICATION OF DVT PUMPS. BENEFITS AND RISKS EXPLAINED. EDUCATED PATIENT 
ABOUT BEING IN A "HYPERCOAGUABLE STATE" PER DR CASTRO. WILL CONTINUE TO EDUCATE THE PATIENT

## 2018-08-12 NOTE — NUR
ms/rn notes

PATIENT OBSERVE LEFT ARM SWOLLEN , NUMBNESS, , MD CONTACTED PATIENT URINE WITH BRIGHT COLOR 
RED OUTPUT, LOVENOX WAS ADMINISTERED YESTERDAY AFTERNOON,PATIENT REQUEST [facility] MD DR. JAKE HICKS HIS MS DOCTOR BE CONTACTED AND IF NEEDED TO BE TRANSFERED TO [facility].

## 2018-08-12 NOTE — NUR
CURRENT ROPER CATHETER NOTED TO BE LEAKING, 

NEW ROPER CATHETER INSERTED , 16 Latvian PER DNP ORDERS. DRAINING LIGHT PINK URINE. WILL 
CONTINUE TO MONITOR

## 2018-08-13 VITALS — DIASTOLIC BLOOD PRESSURE: 73 MMHG | SYSTOLIC BLOOD PRESSURE: 111 MMHG

## 2018-08-13 VITALS — DIASTOLIC BLOOD PRESSURE: 79 MMHG | SYSTOLIC BLOOD PRESSURE: 117 MMHG

## 2018-08-13 VITALS — SYSTOLIC BLOOD PRESSURE: 108 MMHG | DIASTOLIC BLOOD PRESSURE: 76 MMHG

## 2018-08-13 LAB
BASOPHILS # BLD AUTO: 0 /CMM (ref 0–0.2)
BASOPHILS NFR BLD AUTO: 0.1 % (ref 0–2)
BUN SERPL-MCNC: 21 MG/DL (ref 7–18)
CALCIUM SERPL-MCNC: 8.4 MG/DL (ref 8.5–10.1)
CHLORIDE SERPL-SCNC: 102 MMOL/L (ref 98–107)
CO2 SERPL-SCNC: 31 MMOL/L (ref 21–32)
CREAT SERPL-MCNC: 0.6 MG/DL (ref 0.6–1.3)
EOSINOPHIL NFR BLD AUTO: 0 % (ref 0–6)
GLUCOSE SERPL-MCNC: 124 MG/DL (ref 74–106)
HCT VFR BLD AUTO: 43 % (ref 39–51)
HGB BLD-MCNC: 14.6 G/DL (ref 13.5–17.5)
LYMPHOCYTES NFR BLD AUTO: 0.4 /CMM (ref 0.8–4.8)
LYMPHOCYTES NFR BLD AUTO: 3.2 % (ref 20–44)
MAGNESIUM SERPL-MCNC: 2.3 MG/DL (ref 1.8–2.4)
MCH RBC QN AUTO: 31 PG (ref 26–33)
MCHC RBC AUTO-ENTMCNC: 34 G/DL (ref 31–36)
MCV RBC AUTO: 92 FL (ref 80–96)
MONOCYTES NFR BLD AUTO: 0.6 /CMM (ref 0.1–1.3)
MONOCYTES NFR BLD AUTO: 4.2 % (ref 2–12)
NEUTROPHILS # BLD AUTO: 12.5 /CMM (ref 1.8–8.9)
NEUTROPHILS NFR BLD AUTO: 92.5 % (ref 43–81)
PHOSPHATE SERPL-MCNC: 3.8 MG/DL (ref 2.5–4.9)
PLATELET # BLD AUTO: 188 /CMM (ref 150–450)
POTASSIUM SERPL-SCNC: 4.1 MMOL/L (ref 3.5–5.1)
RBC # BLD AUTO: 4.72 MIL/UL (ref 4.5–6)
RDW COEFFICIENT OF VARIATION: 13.7 (ref 11.5–15)
SODIUM SERPL-SCNC: 138 MMOL/L (ref 136–145)
WBC NRBC COR # BLD AUTO: 13.5 K/UL (ref 4.3–11)

## 2018-08-13 RX ADMIN — BACLOFEN SCH MG: 10 TABLET ORAL at 16:04

## 2018-08-13 RX ADMIN — Medication SCH MG: at 17:38

## 2018-08-13 RX ADMIN — Medication PRN EACH: at 16:57

## 2018-08-13 RX ADMIN — Medication PRN EACH: at 22:48

## 2018-08-13 RX ADMIN — BACLOFEN SCH MG: 10 TABLET ORAL at 08:39

## 2018-08-13 RX ADMIN — Medication PRN EACH: at 07:39

## 2018-08-13 RX ADMIN — ENOXAPARIN SODIUM SCH MG: 40 INJECTION SUBCUTANEOUS at 22:39

## 2018-08-13 RX ADMIN — Medication SCH MG: at 22:34

## 2018-08-13 NOTE — NUR
MS/RN NOTES

DR DALTON SEEN THE PATIENT AT BEDSIDE, OBSERVE LEFT UPPER EXTREMITY SWOLLEN AND FEET. 
WITH ORDER FOR AN ULTRASOUND ON LEFT EXTREMITY R/O DVT, KEEP LUE ELEVATED WITH PILLOWS.MD 
ORDER CARRIED OUT.

## 2018-08-13 NOTE — NUR
MS/RN NOTES



RECEIVED PT. LYING IN BED. PT. IS AWAKE, ALERT AND ORIENTED X3. BREATHING EVEN AND UNLABORED 
ON ROOM AIR. NO SOB, RESPIRATORY DISTRESS OR COMPLAINTS OF PAIN NOTED AT THIS TIME. PT. WITH 
RIGHT AC 18 GAUGE IV SALINE LOCK PRESENT, PATENT AND INTACT. PT. WITH ROPER CATHETER 
PRESENT, PATENT AND INTACT DRAINING PINK TINGED URINE. NO BRIGHT RED BLOOD NOTED IN 
CATHETER. BED LOCKED AND IN LOWEST POSITION, SIDE RAILS UP X2, BED ALARM ON, CALL LIGHT 
WITHIN REACH, WILL CONTINUE TO MONITOR.

## 2018-08-13 NOTE — NUR
RN NOTES

PT IS SITTING UP IN BED, RESTING COMFORTABLY. PT ON RA, RESPIRATIONS ARE EVEN AND UNLABORED. 
IV ON RAC INTACT AND SL. ROPER CATHETER IS IN PLACE AND DRAINING TO GRAVITY, NO BRIGHT RED 
BLOOD NOTED IN CATHETER. ALL MEDS WERE GIVEN AS ORDERED AND PT NEEDS MET. SAFETY MEASURES 
ARE IN PLACE, CALL LIGHT IS IN REACH. WILL ENDORSE TO NIGHT SHIFT RN FOR CONTINUITY OF CARE.

## 2018-08-13 NOTE — NUR
ms/rn notes 

report given to am rn for nevin, inform patietn monitoring for bleeding and with procedure 
order by dr pardo for duplex doppler on left upper extremitied to rule out dvt, patient 
left arm and swollen, endorse to am rn , call lights within reach, offered and provide 
fluids, keep comfortable. will monitor.

## 2018-08-13 NOTE — NUR
RN NOTES

PT IS SITTING UP IN BED, AWAKE AND ALERT. PT ON RA, RESPIRATIONS ARE EVEN AND UNLABORED. IV 
ON RAC INTACT AND SL. ROPER CATHETER IS IN PLACE AND DRAINING BLOOD TINGED URINE. NO SIGNS 
OF DISTRESS NOTED. SAFETY MEASURES ARE IN PLACE, CALL LIGHT IS IN REACH. WILL CONTINUE TO 
MONITOR.

## 2018-08-14 VITALS — SYSTOLIC BLOOD PRESSURE: 107 MMHG | DIASTOLIC BLOOD PRESSURE: 73 MMHG

## 2018-08-14 LAB
BASOPHILS # BLD AUTO: 0 /CMM (ref 0–0.2)
BASOPHILS NFR BLD AUTO: 0.1 % (ref 0–2)
BUN SERPL-MCNC: 22 MG/DL (ref 7–18)
CALCIUM SERPL-MCNC: 8.3 MG/DL (ref 8.5–10.1)
CHLORIDE SERPL-SCNC: 102 MMOL/L (ref 98–107)
CO2 SERPL-SCNC: 32 MMOL/L (ref 21–32)
CREAT SERPL-MCNC: 0.6 MG/DL (ref 0.6–1.3)
EOSINOPHIL NFR BLD AUTO: 0 % (ref 0–6)
GLUCOSE SERPL-MCNC: 116 MG/DL (ref 74–106)
HCT VFR BLD AUTO: 40 % (ref 39–51)
HGB BLD-MCNC: 13.6 G/DL (ref 13.5–17.5)
LYMPHOCYTES NFR BLD AUTO: 0.4 /CMM (ref 0.8–4.8)
LYMPHOCYTES NFR BLD AUTO: 3.8 % (ref 20–44)
MAGNESIUM SERPL-MCNC: 2.4 MG/DL (ref 1.8–2.4)
MCH RBC QN AUTO: 31 PG (ref 26–33)
MCHC RBC AUTO-ENTMCNC: 34 G/DL (ref 31–36)
MCV RBC AUTO: 92 FL (ref 80–96)
MONOCYTES NFR BLD AUTO: 0.2 /CMM (ref 0.1–1.3)
MONOCYTES NFR BLD AUTO: 2.1 % (ref 2–12)
NEUTROPHILS # BLD AUTO: 9.4 /CMM (ref 1.8–8.9)
NEUTROPHILS NFR BLD AUTO: 94 % (ref 43–81)
PHOSPHATE SERPL-MCNC: 3.6 MG/DL (ref 2.5–4.9)
PLATELET # BLD AUTO: 175 /CMM (ref 150–450)
POTASSIUM SERPL-SCNC: 4.4 MMOL/L (ref 3.5–5.1)
RBC # BLD AUTO: 4.41 MIL/UL (ref 4.5–6)
RDW COEFFICIENT OF VARIATION: 13.6 (ref 11.5–15)
SODIUM SERPL-SCNC: 137 MMOL/L (ref 136–145)
WBC NRBC COR # BLD AUTO: 10 K/UL (ref 4.3–11)

## 2018-08-14 RX ADMIN — BACLOFEN SCH MG: 10 TABLET ORAL at 08:09

## 2018-08-14 RX ADMIN — Medication SCH MG: at 12:12

## 2018-08-14 RX ADMIN — Medication SCH MG: at 05:51

## 2018-08-14 RX ADMIN — Medication PRN EACH: at 08:00

## 2018-08-14 NOTE — NUR
MS/RN NOTES



PT. IS LYING IN BED AWAKE, ALERT AND ORIENTED X3. BREATHING EVEN AND UNLABORED ON ROOM AIR. 
NO SOB OR RESPIRATORY DISTRESS NOTED AT THIS TIME. PT. WITH RIGHT AC 18 GAUGE IV SALINE LOCK 
PRESENT, PATENT AND INTACT. PT. WITH ROPER CATHETER PRESENT, PATENT AND INTACT DRAINING PINK 
TINGED URINE. ALL PT. NEEDS MET. BED LOCKED AND IN LOWEST POSITION, SIDE RAILS UP X2, BED 
ALARM ON, CALL LIGHT WITHIN REACH, WILL ENDORSE TO DAYSHIFT NURSE FOR CONTINUITY OF CARE.

## 2018-08-14 NOTE — NUR
ms rn discharge notes



discharge instructions given to patient and able to understand instructions.  Signed 
discharge paper and belonging list, no items missing.  Discontinued IV access and pressured 
applied to prevent from bleeding.  Patient will go home with flores catheter MD aware of it, 
explained the risk and benefits x 3 and still patient refused the flores to be remove prior 
discharge, patient will be going home with home health  to arrange.  PNA vaccine 
not given due to patient is <65 years old, flu vaccine is out of season.  Vital signs 
checked and recorded.  Skin picture taken by night shift RN and filed in the patient chart.  
Patient left via ambulance going to his house in stable condition, no complaint of pain or 
discomfort at this time, nor chest pain. On room air and tolerated well.  MD and charge 
nurse made aware.

## 2018-08-14 NOTE — NUR
ms rn initial notes



Received patient in bed, awake, head of bed elevated, no SOB or distress noted, on room air 
and tolerated well. Lee in placed attached to drainage bag with hematuria noted MD was 
aware per night shift RN.  IV intact and patent.  No complaint of pain or discomfort noted. 
Call light with in patient reach, will continue to monitor accordingly.